# Patient Record
Sex: MALE | Race: WHITE | NOT HISPANIC OR LATINO | Employment: UNEMPLOYED | URBAN - METROPOLITAN AREA
[De-identification: names, ages, dates, MRNs, and addresses within clinical notes are randomized per-mention and may not be internally consistent; named-entity substitution may affect disease eponyms.]

---

## 2017-02-17 ENCOUNTER — HOSPITAL ENCOUNTER (OUTPATIENT)
Dept: RADIOLOGY | Facility: OTHER | Age: 27
Discharge: HOME | End: 2017-02-17
Attending: NURSE PRACTITIONER
Payer: MEDICAID

## 2017-02-17 DIAGNOSIS — R51.9 HEADACHE: ICD-10-CM

## 2017-02-17 DIAGNOSIS — H53.2 DIPLOPIA: ICD-10-CM

## 2017-02-17 PROCEDURE — 70450 CT HEAD/BRAIN W/O DYE: CPT | Mod: TC

## 2017-02-17 PROCEDURE — 70450 CT HEAD/BRAIN W/O DYE: CPT | Mod: 26,,, | Performed by: RADIOLOGY

## 2017-03-01 ENCOUNTER — HOSPITAL ENCOUNTER (EMERGENCY)
Facility: OTHER | Age: 27
Discharge: HOME | End: 2017-03-01
Attending: EMERGENCY MEDICINE
Payer: MEDICAID

## 2017-03-01 VITALS
TEMPERATURE: 98 F | RESPIRATION RATE: 18 BRPM | DIASTOLIC BLOOD PRESSURE: 76 MMHG | WEIGHT: 180 LBS | OXYGEN SATURATION: 98 % | SYSTOLIC BLOOD PRESSURE: 118 MMHG | BODY MASS INDEX: 26.58 KG/M2 | HEART RATE: 99 BPM

## 2017-03-01 DIAGNOSIS — F41.9 ANXIETY: Primary | ICD-10-CM

## 2017-03-01 DIAGNOSIS — F32.A DEPRESSION, UNSPECIFIED DEPRESSION TYPE: ICD-10-CM

## 2017-03-01 PROCEDURE — 99282 EMERGENCY DEPT VISIT SF MDM: CPT

## 2017-03-01 RX ORDER — TOPIRAMATE 200 MG/1
TABLET ORAL 2 TIMES DAILY
COMMUNITY
End: 2017-06-01

## 2017-03-01 RX ORDER — HYDROXYZINE HYDROCHLORIDE 50 MG/1
50 TABLET, FILM COATED ORAL 4 TIMES DAILY
COMMUNITY

## 2017-03-01 NOTE — ED TRIAGE NOTES
"Pt states he is unable to walk - states "people in my head took my brain and twisted it around on my spinal cord and that is why I can't walk". Mom states pt has started hearing things and seeing things at home. Acting paranoid - states last night patient thought the TV was moving. Pt sitting up by writer at the computer questioning what was being written and asking about conversation between writer and pt mom.  "

## 2017-03-01 NOTE — DISCHARGE INSTRUCTIONS
Understanding Anxiety Disorders  Almost everyone gets nervous now and then. Its normal to have knots in your stomach before a test, or for your heart to race on a first date. But an anxiety disorder is much more than a case of nerves. In fact, its symptoms may be overwhelming. But treatment can relieve many of these symptoms. Talking to your doctor is the first step.    What are anxiety disorders?  An anxiety disorder causes intense feelings of panic and fear. These feelings may arise for no apparent reason. And they tend to recur again and again. They may prevent you from coping with life and cause you great distress. As a result, you may avoid anything that triggers your fear. In extreme cases, you may never leave the house. Anxiety disorders may cause other symptoms, such as:  · Obsessive thoughts you cant control  · Constant nightmares or painful thoughts of the past  · Nausea, sweating, and muscle tension  · Difficulty sleeping or concentrating  What causes anxiety disorders?  Anxiety disorders tend to run in families. For some people, childhood abuse or neglect may play a role. For others, stressful life events or trauma may trigger anxiety disorders. Anxiety can trigger low self-esteem and poor coping skills.  Common anxiety disorders  · Panic disorder: This causes an intense fear of being in danger.  · Phobias: These are extreme fears of certain objects, places, or events.  · Obsessive-compulsive disorder: This causes you to have unwanted thoughts. You also may perform certain actions over and over.  · Posttraumatic stress disorder: This occurs in people who have survived a terrible ordeal. It can cause nightmares and flashbacks about the event.  · Generalized anxiety disorder: This causes constant worry that can greatly disrupt your life.   Getting better  You may believe that nothing can help you. Or, you might fear what others may think. But most anxiety symptoms can be eased. Having an anxiety  disorder is nothing to be ashamed of. Most people do best with treatment that combines medication and therapy. Although these arent cures, they can help you live a healthier life.  Date Last Reviewed: 2/11/2015  © 0232-2000 [employer]. [address] [place] [address], [place] [address]. All rights reserved. This information is not intended as a substitute for professional medical care. Always follow your healthcare professional's instructions.

## 2017-03-01 NOTE — ED PROVIDER NOTES
"Encounter Date: 3/1/2017       History     Chief Complaint   Patient presents with    Dizziness     Pt CO dizziness "since forever"     Review of patient's allergies indicates:  No Known Allergies  HPI Comments: Patient is 27 year old male history of depression who presents with complaints of dizziness with increasing anxiety and depression. He reports that over the past month he has become increasingly anxious at home. He reports that occasionally he "sees things out the corner of my eye" and it makes him nervous. He is worried something is wrong with him. He states "I just want someone to tell me what's wrong with me". He denies SI or HI. He does report having a psychiatrist as well as a PCP that he see regularly but he repots "no one is helping me". He has had a medication change over the past month (PCP stopped venlafaxine and started Topamax) He has been compliant but reports :it feels like I'm still withdrawing from the first medicine (venlefaxine)". He is accompanied by his mother who reports that he has increasingly become more paranoid but reports that he has not been aggressive. He is currently accompanied by his mother who is at bedside.     The history is provided by the patient.     Past Medical History:   Diagnosis Date    Depression     High cholesterol      No past surgical history on file.  History reviewed. No pertinent family history.  Social History   Substance Use Topics    Smoking status: Current Every Day Smoker     Packs/day: 1.50     Types: Cigarettes    Smokeless tobacco: None    Alcohol use No     Review of Systems   Constitutional: Negative for chills and fever.   HENT: Negative for sore throat and trouble swallowing.    Eyes: Negative for visual disturbance.   Respiratory: Negative for cough and shortness of breath.    Cardiovascular: Negative for chest pain.   Gastrointestinal: Negative for abdominal pain, constipation, diarrhea, nausea and vomiting.   Genitourinary: Negative for " "dysuria and flank pain.   Musculoskeletal: Negative for back pain, neck pain and neck stiffness.   Skin: Negative for rash.   Neurological: Positive for dizziness. Negative for syncope, weakness and headaches.   Psychiatric/Behavioral: Negative for confusion.        Anxiety  "Depression"       Physical Exam   Initial Vitals   BP Pulse Resp Temp SpO2   03/01/17 1406 03/01/17 1406 03/01/17 1406 03/01/17 1406 03/01/17 1406   131/83 103 16 98 °F (36.7 °C) 99 %     Physical Exam    Nursing note and vitals reviewed.  Constitutional: He appears well-developed and well-nourished. He is not diaphoretic. No distress.   Healthy appearing  male in NAD or apparent pain. He makes good eye contact, speaks in clear full sentences and ambulates with ease.    HENT:   Head: Normocephalic and atraumatic.   Eyes: Conjunctivae and EOM are normal. Pupils are equal, round, and reactive to light. Right eye exhibits no discharge. Left eye exhibits no discharge. No scleral icterus.   No nystagmus     Neck: Normal range of motion. Neck supple.   Cardiovascular: Normal rate, regular rhythm and normal heart sounds. Exam reveals no gallop and no friction rub.    No murmur heard.  Pulmonary/Chest: Breath sounds normal. He has no wheezes. He has no rhonchi. He has no rales.   Abdominal: Soft. Bowel sounds are normal. There is no tenderness. There is no rebound and no guarding.   Musculoskeletal: Normal range of motion. He exhibits no edema or tenderness.   Lymphadenopathy:     He has no cervical adenopathy.   Neurological: He is alert and oriented to person, place, and time. He has normal strength. No cranial nerve deficit or sensory deficit.   No gait abnormalities  No CN deficits   Skin: Skin is warm. No rash and no abscess noted. No erythema.   Psychiatric: He has a normal mood and affect. His behavior is normal. Judgment and thought content normal.         ED Course   Procedures  Labs Reviewed - No data to display          Medical " Decision Making:   ED Management:  Urgent evaluation of 27 year old male who presents with complaints of increasing anxiety and depression without evidence of grave disability requiring PEC. Patient is afebrile, non-toxic appearing and hemodynamically stable. Physical exam reveals no focal neuro deficits, benign abdomen and normal cardiopulmonary auscultation. Grave disability not identified. There is no evidence of toxidrome. Patient exhibits good decision making, but does express worseing symptoms that I believe can be managed in the outpatient setting. I did attempt to contact his PCP for furhter input on the patient's case but she did not accept my call. I do not feel that manipulating this patient's medication is appropriate from the ED sitting. He and his mother are educated on ssx of worsening and are told that if these present they should return to the ER. They ask me to offer then additional psychiatry resources as they wish to explore a second option -I instruct them to follow-up at [facility]. Patient is ultimately felt safe for discharge with instruciton to follow-up with [facility] as early as tomorrow for further evaluation. Patient and his mother are amenable to this plan. Case discussed extensively with SP who agrees with plan.                    ED Course     Clinical Impression:   The primary encounter diagnosis was Anxiety. A diagnosis of Depression, unspecified depression type was also pertinent to this visit.          Yulisa Munguia PA-C  03/01/17 2034

## 2017-06-01 ENCOUNTER — HOSPITAL ENCOUNTER (EMERGENCY)
Facility: OTHER | Age: 27
Discharge: HOME | End: 2017-06-01
Attending: EMERGENCY MEDICINE
Payer: MEDICAID

## 2017-06-01 VITALS
HEIGHT: 67 IN | SYSTOLIC BLOOD PRESSURE: 150 MMHG | HEART RATE: 98 BPM | BODY MASS INDEX: 26.37 KG/M2 | TEMPERATURE: 99 F | WEIGHT: 168 LBS | OXYGEN SATURATION: 99 % | RESPIRATION RATE: 16 BRPM | DIASTOLIC BLOOD PRESSURE: 92 MMHG

## 2017-06-01 DIAGNOSIS — R42 DIZZINESS: Primary | ICD-10-CM

## 2017-06-01 PROCEDURE — 99284 EMERGENCY DEPT VISIT MOD MDM: CPT

## 2017-06-01 PROCEDURE — 25000003 PHARM REV CODE 250: Performed by: EMERGENCY MEDICINE

## 2017-06-01 RX ORDER — MECLIZINE HYDROCHLORIDE 25 MG/1
25 TABLET ORAL 3 TIMES DAILY PRN
Qty: 20 TABLET | Refills: 0 | Status: SHIPPED | OUTPATIENT
Start: 2017-06-01

## 2017-06-01 RX ORDER — GABAPENTIN 800 MG/1
800 TABLET ORAL 3 TIMES DAILY
COMMUNITY

## 2017-06-01 RX ORDER — METHYLPREDNISOLONE 4 MG/1
4 TABLET ORAL DAILY
COMMUNITY

## 2017-06-01 RX ORDER — DIAZEPAM 5 MG/1
5 TABLET ORAL
Status: COMPLETED | OUTPATIENT
Start: 2017-06-01 | End: 2017-06-01

## 2017-06-01 RX ADMIN — DIAZEPAM 5 MG: 5 TABLET ORAL at 06:06

## 2017-06-01 NOTE — ED NOTES
Received report from TAWANDA Morgan. Pt is resting in stretcher, connected to the monitor, denies pain. Bed is in the lowest position, side rails up x2, pt instructed to call for assistance.

## 2017-06-01 NOTE — ED TRIAGE NOTES
"Pt reports vertigo for the past 4 months; dizziness has been constant; pt reports a history of vertigo; reports nausea but denies any V/D; ringing in left ear; pt reports he notices vertigo gets worse when smoking "looks like the room is moving/spinning"; reports syncope about 4 days ago  "

## 2017-06-01 NOTE — ED PROVIDER NOTES
Encounter Date: 6/1/2017    SCRIBE #1 NOTE: I, Hernan Cloud, am scribing for, and in the presence of, Dr. York.       History     Chief Complaint   Patient presents with    Dizziness     pt c/o dizziness for 4 months, h/o vertigo     Review of patient's allergies indicates:  No Known Allergies  Time seen by provider: 6:22 PM    This is a 27 y.o. male who presents to the ED with a chief complaint of dizziness, described as a room spinning sensation. He notes a hx of vertigo. The patient reports he has been experiencing intermittent episodes over the past 4 months. He states that his episodes have recently been increasing in frequency. He complains of associated left sided tinnitus and nausea, but denies any vomiting. He reports no recent HA's. The patient states that he has been worked up for these complaints multiple times in the ED and by his PCP. He notes a normal CT scan, but reports no MRI's have been performed. The patient states that he was on a course of methylprednisolone for 3-4 weeks with some improvement. He notes that his dizziness is worse with smoking cigarettes. The patient states that he is normally anxious feeling, and has been so recently. He reports that he has an appointment next month with ENT for these complaints.          Past Medical History:   Diagnosis Date    Depression     High cholesterol     Vertigo      History reviewed. No pertinent surgical history.  History reviewed. No pertinent family history.  Social History   Substance Use Topics    Smoking status: Current Every Day Smoker     Packs/day: 1.50     Types: Cigarettes    Smokeless tobacco: Not on file    Alcohol use No     Review of Systems   Constitutional: Negative for fever.   HENT: Positive for tinnitus. Negative for sore throat.    Respiratory: Negative for shortness of breath.    Cardiovascular: Negative for chest pain.   Gastrointestinal: Positive for nausea. Negative for vomiting.   Genitourinary: Negative for  dysuria.   Musculoskeletal: Negative for back pain.   Skin: Negative for rash.   Neurological: Positive for dizziness. Negative for weakness and headaches.   Hematological: Does not bruise/bleed easily.       Physical Exam     Initial Vitals [06/01/17 1812]   BP Pulse Resp Temp SpO2   (!) 144/84 100 16 99.4 °F (37.4 °C) 98 %     Physical Exam    Nursing note and vitals reviewed.  Constitutional: He appears well-developed and well-nourished. He is not diaphoretic. No distress.   HENT:   Head: Normocephalic and atraumatic.   Right Ear: External ear normal.   Left Ear: External ear normal.   Mouth/Throat: Oropharynx is clear and moist.   Eyes: Conjunctivae are normal. Pupils are equal, round, and reactive to light. Right eye exhibits no discharge. Left eye exhibits no discharge.   Fatiguable horizontal nystagmus to the left.    Neck: Normal range of motion.   Cardiovascular: Normal rate, regular rhythm and normal heart sounds. Exam reveals no gallop and no friction rub.    No murmur heard.  Pulmonary/Chest: Breath sounds normal. No respiratory distress. He has no wheezes. He has no rhonchi. He has no rales.   Abdominal: Soft. There is no tenderness. There is no rebound and no guarding.   Musculoskeletal: Normal range of motion. He exhibits no edema or tenderness.   Neurological: He is alert and oriented to person, place, and time. He has normal strength. No cranial nerve deficit or sensory deficit.   Skin: Skin is warm and dry. No rash and no abscess noted. No erythema. No pallor.   Psychiatric: His behavior is normal. Judgment and thought content normal. His mood appears anxious.         ED Course   Procedures  Labs Reviewed - No data to display          Medical Decision Making:   ED Management:  After examination of the patient I feel that the patient has symptoms of peripheral vertigo/dizziness. There is no signs of vertical nystagmus or any focal neurological deficits to suggest central origin.  The patient has had  these symptoms for 4 months.  He states he has been seen by every emergency department in the city.  He is had multiple workups including labs and CT scan which have been negative.  I see no findings that would necessitate a CT of the brain or further radiologic testing at this time. I doubt blood work or be of benefit.  I will discharge the patient home with conservative therapy to followup with primary care as an outpatient.  He has a follow-up appointment with ENT next month.            Scribe Attestation:   Scribe #1: I performed the above scribed service and the documentation accurately describes the services I performed. I attest to the accuracy of the note.    Attending Attestation:           Physician Attestation for Scribe:  Physician Attestation Statement for Scribe #1: I, Dr. York, reviewed documentation, as scribed by Hernan Cloud in my presence, and it is both accurate and complete.                 ED Course     Clinical Impression:     1. Dizziness                Elder York, DO  06/01/17 1931

## 2017-06-02 NOTE — ED NOTES
"Pt reports no change after receiving diazepam. Pt ambulated, gait steady, with c/o of feeling "off balance". MD notified.   "

## 2017-06-25 ENCOUNTER — NURSE TRIAGE (OUTPATIENT)
Dept: ADMINISTRATIVE | Facility: CLINIC | Age: 27
End: 2017-06-25

## 2017-06-25 NOTE — TELEPHONE ENCOUNTER
"    Reason for Disposition   SEVERE dizziness (e.g., unable to stand, requires support to walk, feels like passing out now)    Protocols used: ST DIZZINESS-A-AH  IAQ deferred due to concerns of patient's cognitive level; patient states he is experiencing severe dizziness he describes as feeling as if he is going to "blackout", headache, n/v, and spinning.   "

## 2017-06-25 NOTE — TELEPHONE ENCOUNTER
Patient advised per OOC protocol verbalized understanding, agreed with recommendations to activate emergency services for immediate and on-site evaluation/assessment for possible treatment or intervention related to current symptoms. EC had no further questions at end of call.

## 2017-06-26 ENCOUNTER — HOSPITAL ENCOUNTER (EMERGENCY)
Facility: OTHER | Age: 27
Discharge: HOME | End: 2017-06-27
Attending: EMERGENCY MEDICINE
Payer: MEDICAID

## 2017-06-26 DIAGNOSIS — R42 DIZZINESS OF UNKNOWN CAUSE: Primary | ICD-10-CM

## 2017-06-26 DIAGNOSIS — R55 SYNCOPE: ICD-10-CM

## 2017-06-26 LAB
AMPHET+METHAMPHET UR QL: NEGATIVE
ANION GAP SERPL CALC-SCNC: 14 MMOL/L
BARBITURATES UR QL SCN>200 NG/ML: NEGATIVE
BASOPHILS # BLD AUTO: 0.05 K/UL
BASOPHILS NFR BLD: 0.5 %
BENZODIAZ UR QL SCN>200 NG/ML: NORMAL
BUN SERPL-MCNC: 19 MG/DL
BZE UR QL SCN: NEGATIVE
CALCIUM SERPL-MCNC: 10.3 MG/DL
CANNABINOIDS UR QL SCN: NEGATIVE
CHLORIDE SERPL-SCNC: 102 MMOL/L
CO2 SERPL-SCNC: 27 MMOL/L
CREAT SERPL-MCNC: 1.3 MG/DL
CREAT UR-MCNC: 286.2 MG/DL
DIFFERENTIAL METHOD: NORMAL
EOSINOPHIL # BLD AUTO: 0.4 K/UL
EOSINOPHIL NFR BLD: 3.9 %
ERYTHROCYTE [DISTWIDTH] IN BLOOD BY AUTOMATED COUNT: 14.1 %
EST. GFR  (AFRICAN AMERICAN): >60 ML/MIN/1.73 M^2
EST. GFR  (NON AFRICAN AMERICAN): >60 ML/MIN/1.73 M^2
ETHANOL SERPL-MCNC: <10 MG/DL
GLUCOSE SERPL-MCNC: 110 MG/DL
HCT VFR BLD AUTO: 51.2 %
HGB BLD-MCNC: 17.6 G/DL
LYMPHOCYTES # BLD AUTO: 2.4 K/UL
LYMPHOCYTES NFR BLD: 22.9 %
MCH RBC QN AUTO: 30.4 PG
MCHC RBC AUTO-ENTMCNC: 34.4 %
MCV RBC AUTO: 88 FL
METHADONE UR QL SCN>300 NG/ML: NEGATIVE
MONOCYTES # BLD AUTO: 0.6 K/UL
MONOCYTES NFR BLD: 5.9 %
NEUTROPHILS # BLD AUTO: 6.9 K/UL
NEUTROPHILS NFR BLD: 66.5 %
OPIATES UR QL SCN: NEGATIVE
PCP UR QL SCN>25 NG/ML: NEGATIVE
PLATELET # BLD AUTO: 193 K/UL
PMV BLD AUTO: 11.2 FL
POTASSIUM SERPL-SCNC: 4.2 MMOL/L
RBC # BLD AUTO: 5.79 M/UL
SODIUM SERPL-SCNC: 143 MMOL/L
TOXICOLOGY INFORMATION: NORMAL
WBC # BLD AUTO: 10.37 K/UL

## 2017-06-26 PROCEDURE — 93010 ELECTROCARDIOGRAM REPORT: CPT | Mod: ,,, | Performed by: INTERNAL MEDICINE

## 2017-06-26 PROCEDURE — 93005 ELECTROCARDIOGRAM TRACING: CPT

## 2017-06-26 PROCEDURE — 80307 DRUG TEST PRSMV CHEM ANLYZR: CPT

## 2017-06-26 PROCEDURE — 85025 COMPLETE CBC W/AUTO DIFF WBC: CPT

## 2017-06-26 PROCEDURE — 82962 GLUCOSE BLOOD TEST: CPT

## 2017-06-26 PROCEDURE — 99284 EMERGENCY DEPT VISIT MOD MDM: CPT | Mod: 25

## 2017-06-26 PROCEDURE — 80320 DRUG SCREEN QUANTALCOHOLS: CPT

## 2017-06-26 PROCEDURE — 80048 BASIC METABOLIC PNL TOTAL CA: CPT

## 2017-06-26 RX ORDER — ZIPRASIDONE HYDROCHLORIDE 40 MG/1
20 CAPSULE ORAL 2 TIMES DAILY
COMMUNITY

## 2017-06-27 VITALS
WEIGHT: 180 LBS | SYSTOLIC BLOOD PRESSURE: 118 MMHG | RESPIRATION RATE: 16 BRPM | OXYGEN SATURATION: 82 % | HEART RATE: 88 BPM | DIASTOLIC BLOOD PRESSURE: 75 MMHG | HEIGHT: 66 IN | TEMPERATURE: 99 F | BODY MASS INDEX: 28.93 KG/M2

## 2017-06-27 LAB — POCT GLUCOSE: 109 MG/DL (ref 70–110)

## 2017-06-27 NOTE — ED NOTES
Pt presents to the ED with c/o syncopal episode today. Pt reports he has been having constant dizziness x4 months. Mother reports pt went to the store around 8pm and came back an hour later and pt was passed out on the floor, she reports she had to put alcohol in his nose to wake him up and he was placed on the couch and had a blank stare and mother reports he would not answer questions but pt answer questions when the EMTs arrived. Mother reports pt has been seen by ENT

## 2017-06-27 NOTE — ED PROVIDER NOTES
"Encounter Date: 6/26/2017    SCRIBE #1 NOTE: I, Eleonora Sanderson , am scribing for, and in the presence of, Dr. Dawson.       History     Chief Complaint   Patient presents with    Dizziness     dizziness that began today; h/o vertigo     Time seen by provider: 10:35 PM    This is a 27 y.o. male who presents with complaint of loss of consciousness. Syncopal episode occurred two hours ago. Pt was found unconscious on the floor, and was unable to speak for one hour after episode. He reports tinnitus, involuntary "body movements," vision disturbance, headache, and dizziness (began four months ago), but denies fever, chills, nausea, vomiting, abdominal pain, chest pain, SOB, myalgias, incontinence, head trauma, facial asymmetry, or numbness. Symptoms are described as intermittent. Dizziness worsens at night. Pt began to see "flashing lights" six months ago, and is unable to walk with onset of symptoms. He underwent CT scan when previously seen in the ED for the same symptoms. Pt denies use of illicit drugs, but admits to use of tobacco.      The history is provided by the patient and a significant other.     Review of patient's allergies indicates:  No Known Allergies  Past Medical History:   Diagnosis Date    Depression     High cholesterol     Vertigo      No past surgical history on file.  No family history on file.  Social History   Substance Use Topics    Smoking status: Current Every Day Smoker     Packs/day: 1.50     Types: Cigarettes    Smokeless tobacco: Not on file    Alcohol use No     Review of Systems   Constitutional: Negative for chills and fever.   HENT: Positive for tinnitus. Negative for sore throat.    Eyes: Positive for visual disturbance.   Respiratory: Negative for shortness of breath.    Cardiovascular: Negative for chest pain.   Gastrointestinal: Negative for abdominal pain, nausea and vomiting.   Genitourinary: Negative for dysuria.        Negative for incontinence.   Musculoskeletal: Negative " "for back pain and myalgias.        Negative for head trauma.    Skin: Negative for rash.   Neurological: Positive for dizziness, syncope, speech difficulty (resolved) and headaches. Negative for facial asymmetry, weakness and numbness.        Positive for involuntary "body movements."   Hematological: Does not bruise/bleed easily.       Physical Exam     Initial Vitals [06/26/17 2134]   BP Pulse Resp Temp SpO2   133/80 100 16 100.2 °F (37.9 °C) 96 %      MAP       97.67         Physical Exam    Nursing note and vitals reviewed.  Constitutional: He appears well-developed and well-nourished. He is not diaphoretic. No distress.   HENT:   Head: Normocephalic and atraumatic.   Right Ear: External ear normal.   Left Ear: External ear normal.   Eyes: EOM are normal. Pupils are equal, round, and reactive to light. Right eye exhibits no discharge. Left eye exhibits no discharge.   Neck: Normal range of motion.   Cardiovascular: Normal rate, regular rhythm and normal heart sounds. Exam reveals no gallop and no friction rub.    No murmur heard.  Pulmonary/Chest: Breath sounds normal. No respiratory distress. He has no wheezes. He has no rhonchi. He has no rales.   Abdominal: Soft. There is no tenderness. There is no rebound and no guarding.   Musculoskeletal: Normal range of motion. He exhibits no edema or tenderness.   Neurological: He is alert and oriented to person, place, and time.   Cranial nerves III through XII grossly intact.  5/5 motor strength all 4 extremities.  Sensation is normal.  Finger to nose normal. Heel to shin normal. Speech is normal. Pt refuses to walk.   Skin: Skin is warm and dry. No rash and no abscess noted. No erythema. No pallor.   Psychiatric: He has a normal mood and affect. His behavior is normal. Judgment and thought content normal.         ED Course   Procedures  Labs Reviewed   CBC W/ AUTO DIFFERENTIAL   BASIC METABOLIC PANEL   DRUG SCREEN PANEL, URINE EMERGENCY   ALCOHOL,MEDICAL (ETHANOL) "     EKG Readings: (Independently Interpreted)   Normal sinus rhythm at a rate of 80 bpm. T wave inversions in lead 3 only. No ST changes.           Medical Decision Making:   Clinical Tests:   Lab Tests: Ordered and Reviewed  Medical Tests: Ordered and Reviewed    Additional MDM:   Comments: 27-year-old male presents complaining of dizziness that has been present for approximately 4-6 months.  He is a very poor historian and was unable to provide much of a history.  When he does report is very inconsistent.  He states that he has dizziness when he is sitting in a stationary chair laying down in bed but not associated in the wheelchair or car.  He also states he has dizziness with walking only at night.  Friend was also present and reported that he had a syncopal episode tonight.  However, this is not mentioned as being the reason for this visit until the end of my assessment.  He does report being evaluated by ENT for vertigo and also has an appointment with an audiologist.  The symptoms he describes are very inconsistent with vertigo.  I did explain this to the patient and his friend.    Vital signs department were within normal limits.  His neuro exam was unremarkable except for gait which could not be assessed because he refused to stand and walk.  I did review his medical records and he has had a recent head CT which showed no evidence of an acute process.  I did obtain an EKG and blood work given the report of a syncopal episode tonight without significant abnormalities.  Did not feel any further imaging was indicated during this visit, however, I did recommend patient follow up with neurology given this constellation of complaints.  He was given information to neurology and discharged in stable condition..          Scribe Attestation:   Scribe #1: I performed the above scribed service and the documentation accurately describes the services I performed. I attest to the accuracy of the note.    Attending  Attestation:           Physician Attestation for Scribe:  Physician Attestation Statement for Scribe #1: I, Dr. Dawson, reviewed documentation, as scribed by Eleonora Sanderson  in my presence, and it is both accurate and complete.                 ED Course     Clinical Impression:     1. Dizziness of unknown cause    2. Syncope                                 Adele Dawson MD  06/27/17 0043

## 2017-06-28 ENCOUNTER — CLINICAL SUPPORT (OUTPATIENT)
Dept: AUDIOLOGY | Facility: CLINIC | Age: 27
End: 2017-06-28
Payer: MEDICAID

## 2017-06-28 ENCOUNTER — OFFICE VISIT (OUTPATIENT)
Dept: OTOLARYNGOLOGY | Facility: CLINIC | Age: 27
End: 2017-06-28
Payer: MEDICAID

## 2017-06-28 VITALS — DIASTOLIC BLOOD PRESSURE: 80 MMHG | SYSTOLIC BLOOD PRESSURE: 121 MMHG | HEART RATE: 84 BPM | TEMPERATURE: 98 F

## 2017-06-28 DIAGNOSIS — R62.50 DEVELOPMENT DELAY: ICD-10-CM

## 2017-06-28 DIAGNOSIS — Z86.59 HISTORY OF ANXIETY: ICD-10-CM

## 2017-06-28 DIAGNOSIS — Z86.69 HISTORY OF MENIERE'S DISEASE: ICD-10-CM

## 2017-06-28 DIAGNOSIS — Z86.59 HISTORY OF PANIC ATTACKS: ICD-10-CM

## 2017-06-28 DIAGNOSIS — H90.3 SENSORINEURAL HEARING LOSS (SNHL) OF BOTH EARS: Primary | ICD-10-CM

## 2017-06-28 DIAGNOSIS — Z87.898 HISTORY OF SYNCOPE: ICD-10-CM

## 2017-06-28 DIAGNOSIS — H90.3 HEARING LOSS, SENSORINEURAL, HIGH FREQUENCY, BILATERAL: Primary | ICD-10-CM

## 2017-06-28 DIAGNOSIS — R27.0 ATAXIA: ICD-10-CM

## 2017-06-28 DIAGNOSIS — Z87.898 HISTORY OF VERTIGO: ICD-10-CM

## 2017-06-28 PROCEDURE — 99999 PR PBB SHADOW E&M-EST. PATIENT-LVL III: CPT | Mod: PBBFAC,,, | Performed by: OTOLARYNGOLOGY

## 2017-06-28 PROCEDURE — 99213 OFFICE O/P EST LOW 20 MIN: CPT | Mod: PBBFAC | Performed by: OTOLARYNGOLOGY

## 2017-06-28 PROCEDURE — 99203 OFFICE O/P NEW LOW 30 MIN: CPT | Mod: S$PBB,,, | Performed by: OTOLARYNGOLOGY

## 2017-06-28 RX ORDER — GABAPENTIN 600 MG/1
TABLET ORAL
Refills: 6 | COMMUNITY
Start: 2017-06-13

## 2017-06-28 RX ORDER — HYDROXYZINE PAMOATE 50 MG/1
CAPSULE ORAL
Refills: 3 | COMMUNITY
Start: 2017-06-12

## 2017-06-28 NOTE — LETTER
June 28, 2017      [facility]  [address] [address]           [place] [facility] Otorhinolaryngology  [address]  [facility] [address]  Phone: [phone]  Fax: [phone]          Patient: Gatito Duarte Jr.   MR Number: [id number]   YOB: 1990   Date of Visit: 6/28/2017       Dear [facility]:    Thank you for referring Gatito Duarte to me for evaluation. Attached you will find relevant portions of my assessment and plan of care.    If you have questions, please do not hesitate to call me. I look forward to following Gatito Duarte along with you.    Sincerely,    Simeon Garduno III, MD    Enclosure  CC:  No Recipients    If you would like to receive this communication electronically, please contact [email or web address] or [phone] to request more information on [employer] access.    For providers and/or their staff who would like to refer a patient to [facility], please contact us through our one-stop-shop provider referral line, [facility], at [phone].    If you feel you have received this communication in error or would no longer like to receive these types of communications, please e-mail [email or web address]

## 2017-06-28 NOTE — PATIENT INSTRUCTIONS
Audiometry reviewed  Neurology consultation recommended [phone]  Psychiatry evaluation recommended  Pt. may schedule for VNG testing scheduled at convenience   withdraw Atarax/Antivert  prior to testing)   Old records may be helpful ( internist/psychiatrist, neurologist/etc)    Rickey De La Fuente best source of information re: patient and family situation # [phone]

## 2017-06-28 NOTE — PROGRESS NOTES
"Subjective:       Patient ID: Gatito Duarte Jr. is a 27 y.o. male.    Chief Complaint: No chief complaint on file.    HPI: Mr. Duarte is a 27-year-old  male who presents with his mother today.  He presents in a wheelchair because he appears to be  afraid to walk without falling.  He was recently evaluated in the ED 6/1/17 for dizziness.  He was diagnosed with dizziness.    He returned to the ED 6/26/17 after a syncopal episode occurring 2 hours prior to admission with loss of consciousness. The patient was found "unconscious" on the floor unable to speak for one hour after the episode.  The patient reported tinnitus and involuntary body movements, visual disturbance, headache and dizziness.      His learning ability is limited( per Rickey De La Fuente). His family situation has affected him according to her knowledge of the family ( parents broke up)   [place] the [facility].His mother is originally from [facility].  His mother moved here as Gatito's doctors appointment/medical care were scheduled all in [place].    His symptoms began about 4-5 months ago according to his mother.  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  He used to be followed by a psychiatry service i.e. [facility], but no longer.  His mother indicates his treatment at several other EDs in [place] and [facility] (when he leaves [facility] to visit other relatives).  He is disabled according to his mother; he has a developmental delay problem/learning deficit; she is poorly articulate on the subject.  She is poorly articulate in general.  The patient is close to a family member, phone # [phone], Rickey De La Fuente( mother's sister in law) .   She says he may " fake" ataxia symptoms( witessed abrupt changes in ability I.e.riding 4 coello and then cannot walk). One day he can fish and the next day he cannot walk.  She says he had been on Effexor; he is no longer taking it. He sees a " "psychiatrist ( but he hasn't seen one in a while).  The patient has been incarcerated at one point and he has spent 10 years in a state mental facility in [facility] according to Rickey De La Fuente..      His medication list includes Atarax and Neurontin as well as Geodon.  His mother says he cannot keep still at home despite inability to walk.He scoots on a chair at home.  He has particularly trouble walking at night. Darkness bothers him.  He does indicate a movement sensation with his dizziness symptoms, "spinning".  His symptoms are getting worse.  He indicates that his eyes dilate at times.    I have no prior medical  information relevant to this patient other than his 2 [facility] ED visits.    I later talked to Rickey De La Fuente who filled me and on the various details noted above.    PMH: Vertigo PSH:  Family history: High cholesterol  ALLERGIES: None  Habits: The patient has smoked for one year; the patient denies alcohol use; patient does drink ann  Occupation: Disabled; he remains at home presently    Review of Systems   Ears: Positive for ear pain, ringing in ear and head trauma (from falling down).    Mouth/Throat: Positive for pain swallowing (sometimes) and impaired swallowing (sometimes).   Eyes:  Positive for visual change.   Other:  Positive for depression and anxiety. Negative for rash.         The patient completed an audiometric study earlier today, the results of which are reviewed with him.  Objective:       Blood pressure 121/80 pulse 84 temperature 97.9  Gen.: Apparently alert and oriented gentleman in no acute distress.  He was cooperative during the examination today.  Romberg testing reveals the patient's apparent  inability to stand on his own 2 feet without falling backwards several times with eyes closed after 10-20 seconds.  Physical Exam   Constitutional: He is oriented to person, place, and time. He appears well-developed and well-nourished.   HENT:   Head: Normocephalic.   Right Ear: Hearing, " tympanic membrane and ear canal normal. No drainage. No foreign bodies. No mastoid tenderness. Tympanic membrane is not perforated. No decreased hearing is noted.   Left Ear: Hearing, tympanic membrane and ear canal normal. No drainage. No foreign bodies. No mastoid tenderness. Tympanic membrane is not perforated. No decreased hearing is noted.   Ears:    Nose: No nose lacerations, nasal deformity, septal deviation or nasal septal hematoma. No epistaxis. Right sinus exhibits no maxillary sinus tenderness and no frontal sinus tenderness. Left sinus exhibits no maxillary sinus tenderness and no frontal sinus tenderness.   Mouth/Throat: Uvula is midline, oropharynx is clear and moist and mucous membranes are normal. He does not have dentures. No oral lesions. No trismus in the jaw. No uvula swelling or dental caries. No oropharyngeal exudate or tonsillar abscesses.   Neck: No thyromegaly present.   Pulmonary/Chest: Effort normal. No stridor.   Lymphadenopathy:     He has no cervical adenopathy.   Neurological: He is alert and oriented to person, place, and time.   Skin: No rash noted.   Psychiatric: His behavior is normal.       Assessment:       1. Hearing loss, sensorineural, high frequency, bilateral    2. History of vertigo    3. History of syncope    4. History of panic attacks    5. History of Meniere's disease    6. Development delay    7. History of anxiety    8. Ataxia        Plan:     Audiometry reviewed  Neurology consultation recommended [id number]  Psychiatrist involvement is encouraged  Pt. may schedule for VNG testing scheduled at convenience   withdraw Atarax/Antivert  prior to testing)   Old records may be helpful ( internist/psychiatrist, neurologist/etc)

## 2017-06-28 NOTE — PROGRESS NOTES
"Mr. Duarte was seen in the clinic today for a hearing evaluation. He reported bilateral tinnitus and constant dizziness, accompanied by head pressure and episodes of "blacking out". Mr. Duarte reported he had no concerns with his hearing.     Audiological testing revealed essentially normal hearing, bilaterally, with the exception of a mild sensorineural hearing loss at 8 kHz in the right ear and from 3-8 kHz in the left ear. A speech reception threshold was obtained at 20 dBHL, bilaterally. Speech discrimination was 100%, bilaterally.    Tympanometry revealed a Type A tympanogram in the right ear and a Type As tympanogram in the left ear.    Recommendations:  1. Otologic evaluation  2. Annual hearing evaluation  3. Noise protection          "

## 2017-06-30 ENCOUNTER — OFFICE VISIT (OUTPATIENT)
Dept: NEUROLOGY | Facility: CLINIC | Age: 27
End: 2017-06-30
Payer: MEDICAID

## 2017-06-30 DIAGNOSIS — R42 VERTIGO: Primary | ICD-10-CM

## 2017-06-30 DIAGNOSIS — F99 CHRONIC MENTAL ILLNESS: ICD-10-CM

## 2017-06-30 PROCEDURE — 99212 OFFICE O/P EST SF 10 MIN: CPT | Mod: PBBFAC,PO | Performed by: NEUROMUSCULOSKELETAL MEDICINE & OMM

## 2017-06-30 PROCEDURE — 99204 OFFICE O/P NEW MOD 45 MIN: CPT | Mod: S$PBB,,, | Performed by: NEUROMUSCULOSKELETAL MEDICINE & OMM

## 2017-06-30 PROCEDURE — 99999 PR PBB SHADOW E&M-EST. PATIENT-LVL II: CPT | Mod: PBBFAC,,, | Performed by: NEUROMUSCULOSKELETAL MEDICINE & OMM

## 2017-06-30 NOTE — PROGRESS NOTES
Gatito Duarte Jr.  1990  Review of patient's allergies indicates:  No Known Allergies  [unfilled]    Past Medical History:   Diagnosis Date    Depression     High cholesterol     Vertigo      Social History     Social History    Marital status: Single     Spouse name: N/A    Number of children: N/A    Years of education: N/A     Occupational History    Not on file.     Social History Main Topics    Smoking status: Current Every Day Smoker     Packs/day: 1.50     Types: Cigarettes    Smokeless tobacco: Not on file    Alcohol use No    Drug use: No    Sexual activity: Not on file     Other Topics Concern    Not on file     Social History Narrative    No narrative on file     No family history on file.    Review of systems:  Constitutional-negative  Eyes-negative  ENT, mouth-negative  Cardiovascular-negative  Respiratory-negative  GI-negative  - negative  Musculoskeletal-negative  Skin-negative  Neurologic-negative  Psychiatric-negative  Endocrine-negative  Hematology/lymph nodes-negative  Allergies/immunology-negative  Gatito Duarte Jr.  1990  Review of patient's allergies indicates:  No Known Allergies  [unfilled]    Past Medical History:   Diagnosis Date    Depression     High cholesterol     Vertigo      Social History     Social History    Marital status: Single     Spouse name: N/A    Number of children: N/A    Years of education: N/A     Occupational History    Not on file.     Social History Main Topics    Smoking status: Current Every Day Smoker     Packs/day: 1.50     Types: Cigarettes    Smokeless tobacco: Not on file    Alcohol use No    Drug use: No    Sexual activity: Not on file     Other Topics Concern    Not on file     Social History Narrative    No narrative on file     No family history on file.    Review of systems:  Constitutional-negative  Eyes-negative  ENT, mouth-negative  Cardiovascular-negative  Respiratory-negative  GI-negative  -  negative  Musculoskeletal-negative  Skin-negative  Neurologic-negative  Psychiatric mental illness  Endocrine-negative  Hematology/lymph nodes-negative  Allergies/immunology-negative  Gen. Appearance: Well-developed with no obvious deformities  Carotid arteries symmetrical pulses  Peripheral vascular shows symmetrical pulses with no obvious edema or tenderness  Social History : Patient presents with his mother.  He previously been in a group home for mental illness for 10 years and is now living with his mother over the last year.  He is disabled based on mental illness history.  Present history: This is a 27-year-old white male who presents with a history of dizziness for the last 4 months.  He describes spinning dizziness on a daily basis lasting all day long.  It wakes him up at night.  His mother relates that he has passed out from the dizziness on several occasions.  2-17-17 he had a CT scan of the head which was normal.  He recently saw ENT who felt that this was a neurologic problem not an ENT problem.  He has not had an MRI of the brain.  Neurological Exam:  Mental status-alert and oriented to person, place, and time; attention span and concentration is good. Fund of knowledge-patient is aware of current events and able to give detailed history of the current problem.recent and remote memory seems intact. Language function is normal with no evidence of aphasia  Cranial nerves:Visual acuity to hand chart -normal; visual fields to confrontation normal;pupils were equal and reactive to light ;no evidence of ptosis ;  funduscopic examination was normal with sharp disc margins. external ocular movements were full with no nystagmus. Facial sensation to pinprick : normal ; corneal reflexes intact; Facial muscles were symmetrical. Hearing is unimpaired symmetrical finger rub; Tongue movements - normal ; palate movements - normal ;Swallowing unimpaired. Shoulder shrug was intact with good strength Speech was  normal  Motor examination: Upper : normal                                      Lower extremities - Normal;muscle tone was normal ;                  Right-handed  Sensory examination:   Upper; normal pinprick and soft touch ;   Lower extremities - normal and symmetrical.   Vibration sense: 15-20 seconds @ toes  Deep tendon reflexes: upper extremities :1-2+ symmetrical ;     lower extremities KJ- 1-2 +; AJ - 1-2+ Both plantar responses were flexor  Cerebellar examination upper: Normal finger to nose and rapid alternating movements  Gait: Steady with no ataxia;      heel and toe walk normal  Romberg test: negative       Tandem gait: Normal    Involuntary movements: Negative  TMJ - no tenderness  Cervical examination: Full range of motion with no pain Cervical tenderness :negative  Lumbar examination: Low back tenderness-negative                  Sciatic notchtenderness-negative            Straight leg raising : negative    Impression: Vertigo with normal neurologic exam and no evidence of ataxia.  History mental illness.    Recommendations/Plan : MRI brain with and without contrast: Referred to Dr. Gagandeep Middleton

## 2017-06-30 NOTE — LETTER
June 30, 2017      Simeon Garduno III, MD  [address]  [place] [address]           [place] - Neurology  [address] [facility] [address]  Phone: [phone]          Patient: Gatito Duarte Jr.   MR Number: [id number]   YOB: 1990   Date of Visit: 6/30/2017       Dear Dr. Simeon Garduno III:    Thank you for referring Gatito Duarte to me for evaluation. Attached you will find relevant portions of my assessment and plan of care.    If you have questions, please do not hesitate to call me. I look forward to following Gatito Duarte along with you.    Sincerely,    Ten Hatfield MD    Enclosure  CC:  No Recipients    If you would like to receive this communication electronically, please contact [email or web address] or [phone] to request more information on [employer] Link access.    For providers and/or their staff who would like to refer a patient to [facility], please contact us through our one-stop-shop provider referral line, [facility], at [phone].    If you feel you have received this communication in error or would no longer like to receive these types of communications, please e-mail [email or web address]

## 2017-07-05 ENCOUNTER — TELEPHONE (OUTPATIENT)
Dept: NEUROLOGY | Facility: CLINIC | Age: 27
End: 2017-07-05

## 2017-07-05 ENCOUNTER — TELEPHONE (OUTPATIENT)
Dept: OTOLARYNGOLOGY | Facility: CLINIC | Age: 27
End: 2017-07-05

## 2017-07-05 NOTE — TELEPHONE ENCOUNTER
----- Message from Vish Machado sent at 7/5/2017  3:31 PM CDT -----  Contact: Mother  Would like someone to call her regarding MRI. States she received a call to r/s but there were no appt ever scheduled.       Call: [phone]      I have scheduled this patient's MRI after speaking with this patient I told him I would call him again to schedule his appointment when I did the line was busy therefore I have scheduled his MRI and will mail it to his home

## 2017-07-05 NOTE — TELEPHONE ENCOUNTER
----- Message from Sabrina Adair sent at 7/5/2017  2:45 PM CDT -----  Contact: pts mom Radha Garcia is calling to speak with the nurse pts son needs to have an MRI done pt isn't scheduled can you please call pts mom at [phone] jc

## 2017-08-10 ENCOUNTER — NURSE TRIAGE (OUTPATIENT)
Dept: ADMINISTRATIVE | Facility: CLINIC | Age: 27
End: 2017-08-10

## 2019-11-30 NOTE — ED AVS SNAPSHOT
[facility]  [address]  [facility] [address]               Gatito Duarte JrTricia   3/1/2017  2:58 PM   ED    Description:  Male : 1990   Department:  [facility]           Your Care was Coordinated By:     Provider Role From To    Gagandeep Rogers II, MD Attending Provider 17 1514 --    Blanca Robbins PA-C Physician Assistant 17 1501 17 1506    Yulisa Munguia PA-C Physician Assistant 17 1513 --      Reason for Visit     Dizziness           Diagnoses this Visit        Comments    Anxiety    -  Primary     Depression, unspecified depression type           ED Disposition     None           To Do List           Follow-up Information     Follow up with [facility] In 1 day.    Specialties:  Behavioral Health, Psychiatry, Psychology    Why:  For further evaluation     Contact information:    [address] [facility] [address]  [phone]        [facility]     [facility] Nurse Care Line -  Assistance  Registered nurses in the [facility] provide clinical advisement, health education, appointment booking, and other advisory services.  Call for this free service at [phone].             Medications           Message regarding Medications     Verify the changes and/or additions to your medication regime listed below are the same as discussed with your clinician today.  If any of these changes or additions are incorrect, please notify your healthcare provider.        STOP taking these medications     fenofibrate (TRICOR) 145 MG tablet Take 145 mg by mouth once daily.    venlafaxine (EFFEXOR-XR) 37.5 MG 24 hr capsule Take 37.5 mg by mouth 2 (two) times daily. as directed           Verify that the below list of medications is an accurate representation of the medications you are currently taking.  If none reported, the list may be blank. If incorrect, please contact  REASSESSMENT COMPLETED PER FLOWSHEET, SEE FLOWSHEET FOR INFORMATION. PT IN BED
RESTING WITH EYES CLOSED. NO ACUTE NEEDS OR DISTRESS NOTED AT THIS TIME. WILL
CONT TO MONITOR. your healthcare provider. Carry this list with you in case of emergency.           Current Medications     hydrOXYzine (ATARAX) 50 MG tablet Take 50 mg by mouth 4 (four) times daily.    simvastatin (ZOCOR) 40 MG tablet Take 40 mg by mouth every evening.    topiramate (TOPAMAX) 200 MG Tab Take by mouth 2 (two) times daily.           Clinical Reference Information           Your Vitals Were     BP                   131/83 (BP Location: Left arm, Patient Position: Sitting)           Allergies as of 3/1/2017     No Known Allergies      Immunizations Administered on Date of Encounter - 3/1/2017     None      ED Micro, Lab, POCT     None      ED Imaging Orders     None        Discharge Instructions         Understanding Anxiety Disorders  Almost everyone gets nervous now and then. Its normal to have knots in your stomach before a test, or for your heart to race on a first date. But an anxiety disorder is much more than a case of nerves. In fact, its symptoms may be overwhelming. But treatment can relieve many of these symptoms. Talking to your doctor is the first step.    What are anxiety disorders?  An anxiety disorder causes intense feelings of panic and fear. These feelings may arise for no apparent reason. And they tend to recur again and again. They may prevent you from coping with life and cause you great distress. As a result, you may avoid anything that triggers your fear. In extreme cases, you may never leave the house. Anxiety disorders may cause other symptoms, such as:  · Obsessive thoughts you cant control  · Constant nightmares or painful thoughts of the past  · Nausea, sweating, and muscle tension  · Difficulty sleeping or concentrating  What causes anxiety disorders?  Anxiety disorders tend to run in families. For some people, childhood abuse or neglect may play a role. For others, stressful life events or trauma may trigger anxiety disorders. Anxiety can trigger low self-esteem and poor coping  skills.  Common anxiety disorders  · Panic disorder: This causes an intense fear of being in danger.  · Phobias: These are extreme fears of certain objects, places, or events.  · Obsessive-compulsive disorder: This causes you to have unwanted thoughts. You also may perform certain actions over and over.  · Posttraumatic stress disorder: This occurs in people who have survived a terrible ordeal. It can cause nightmares and flashbacks about the event.  · Generalized anxiety disorder: This causes constant worry that can greatly disrupt your life.   Getting better  You may believe that nothing can help you. Or, you might fear what others may think. But most anxiety symptoms can be eased. Having an anxiety disorder is nothing to be ashamed of. Most people do best with treatment that combines medication and therapy. Although these arent cures, they can help you live a healthier life.  Date Last Reviewed: 2/11/2015  © 5621-8442 [employer]. [address] [place] [address]. All rights reserved. This information is not intended as a substitute for professional medical care. Always follow your healthcare professional's instructions.          [facility] Sign-Up     Activating your [facility] account is as easy as 1-2-3!     1) Visit [email or web address], select Sign Up Now, enter this activation code and your date of birth, then select Next.  [email or web address]  Expires: 4/15/2017  4:22 PM      2) Create a username and password to use when you visit [facility] in the future and select a security question in case you lose your password and select Next.    3) Enter your e-mail address and click Sign Up!    Additional Information  If you have questions, please e-mail [email or web address] or call [phone] to talk to our [facility] staff. Remember, [facility] is NOT to be used for urgent needs. For medical emergencies, dial 911.         Smoking Cessation     If you would like to quit smoking:   You may be  eligible for free services if you are a [place] resident and started smoking cigarettes before September 1, 1988.  Call the Smoking Cessation Trust (SCT) toll free at (627) 812-1631 or (906) 389-7223.   Call 9-336-QUIT-NOW if you do not meet the above criteria.             [facility] complies with applicable Federal civil rights laws and does not discriminate on the basis of race, color, national origin, age, disability, or sex.        Language Assistance Services     ATTENTION: Language assistance services are available, free of charge. Please call [phone].      ATENCIÓN: Si habla español, tiene a roca disposición servicios gratuitos de asistencia lingüística. Llame al [phone].     CHÚ Ý: N?u b?n nói Ti?ng Vi?t, có các d?ch v? h? tr? ngôn ng? mi?n phí dành cho b?n. G?i s? [phone].

## 2022-05-02 NOTE — TELEPHONE ENCOUNTER
Reason for Disposition   SEVERE dizziness (e.g., unable to stand, requires support to walk, feels like passing out now)     Patient states he symptoms are severe and dizziness/spinning sensation and ear pressure has been off and on for month. Patient states he is been evaluated but not diagnosis however his symptoms persist.    Protocols used: ST DIZZINESS-A-OH      
Patient advised per O[facility] protocol verbalized understanding, agreed with recommendations to seek medical care at the nearest/local ED of choice for medical evaluation/treatment/intervention of current symptoms; patient had no further questions at end of call.  
Initial (On Arrival)